# Patient Record
(demographics unavailable — no encounter records)

---

## 2025-03-05 NOTE — PHYSICAL EXAM
[No Acute Distress] : no acute distress [Well Nourished] : well nourished [Well Developed] : well developed [Well-Appearing] : well-appearing [Normal Sclera/Conjunctiva] : normal sclera/conjunctiva [PERRL] : pupils equal round and reactive to light [EOMI] : extraocular movements intact [Normal Outer Ear/Nose] : the outer ears and nose were normal in appearance [Normal Oropharynx] : the oropharynx was normal [No JVD] : no jugular venous distention [No Lymphadenopathy] : no lymphadenopathy [Supple] : supple [Thyroid Normal, No Nodules] : the thyroid was normal and there were no nodules present [No Respiratory Distress] : no respiratory distress  [No Accessory Muscle Use] : no accessory muscle use [Clear to Auscultation] : lungs were clear to auscultation bilaterally [Normal Rate] : normal rate  [Regular Rhythm] : with a regular rhythm [Normal S1, S2] : normal S1 and S2 [No Murmur] : no murmur heard [No Varicosities] : no varicosities [Pedal Pulses Present] : the pedal pulses are present [No Edema] : there was no peripheral edema [No Extremity Clubbing/Cyanosis] : no extremity clubbing/cyanosis [Soft] : abdomen soft [Non Tender] : non-tender [Non-distended] : non-distended [Normal Bowel Sounds] : normal bowel sounds [Normal Posterior Cervical Nodes] : no posterior cervical lymphadenopathy [Normal Anterior Cervical Nodes] : no anterior cervical lymphadenopathy [No CVA Tenderness] : no CVA  tenderness [No Spinal Tenderness] : no spinal tenderness [No Joint Swelling] : no joint swelling [Grossly Normal Strength/Tone] : grossly normal strength/tone [No Rash] : no rash [Coordination Grossly Intact] : coordination grossly intact [No Focal Deficits] : no focal deficits [Normal Gait] : normal gait [Deep Tendon Reflexes (DTR)] : deep tendon reflexes were 2+ and symmetric [Normal Affect] : the affect was normal [Normal Insight/Judgement] : insight and judgment were intact

## 2025-03-06 NOTE — ASSESSMENT
[FreeTextEntry1] : 33 y.o. F with PMHx of hypothyroidism and anxiety presents to the office for annual physical examination

## 2025-03-06 NOTE — HEALTH RISK ASSESSMENT
[Yes] : Yes [Monthly or less (1 pt)] : Monthly or less (1 point) [1 or 2 (0 pts)] : 1 or 2 (0 points) [Never (0 pts)] : Never (0 points) [No] : In the past 12 months have you used drugs other than those required for medical reasons? No [No falls in past year] : Patient reported no falls in the past year [0] : 2) Feeling down, depressed, or hopeless: Not at all (0) [PHQ-2 Negative - No further assessment needed] : PHQ-2 Negative - No further assessment needed [Never] : Never [With Family] : lives with family [Employed] : employed [] :  [Sexually Active] : sexually active [Fully functional (bathing, dressing, toileting, transferring, walking, feeding)] : Fully functional (bathing, dressing, toileting, transferring, walking, feeding) [Fully functional (using the telephone, shopping, preparing meals, housekeeping, doing laundry, using] : Fully functional and needs no help or supervision to perform IADLs (using the telephone, shopping, preparing meals, housekeeping, doing laundry, using transportation, managing medications and managing finances) [Reports normal functional visual acuity (ie: able to read med bottle)] : Reports normal functional visual acuity [Audit-CScore] : 1 [[id number]] : 0 [High Risk Behavior] : no high risk behavior [Reports changes in hearing] : Reports no changes in hearing [PapSmearDate] : 04/2025 [PapSmearComments] : pending

## 2025-03-06 NOTE — PLAN
[FreeTextEntry1] : HCM # up to date with FLU RSV vaccinations 08/2024 - Declining COVID booster at this time - Patient has f/u for pap smear 04/2025  1. Annual physical examination - Obtain CBC, CMP, lipid profile, Hgb A1c, and thyroid panel  2. Hypothyroidism - Currently on Synthroid 125mcg qd - F/u TSH and free t4  3. Anxiety and depression - Well controlled on Zoloft 75mg.

## 2025-03-06 NOTE — HISTORY OF PRESENT ILLNESS
[FreeTextEntry1] : annual wellness [de-identified] : 33 y.o. F with PMHx of hypothyroidism, anxiety, depression, and rectocele, presents to the office for annual physical examination. Patient gave birth to male infant 10/2024,  no complications. Patient overall states she is in good health, up to date with routine visits to GYN and Endocrine. Patient last visited endocrine  had TSH tested and synthroid dosage increased to 125 mcg. Patient reports she started Semaglutide .75mg due to weight gain during pregnancy and has had 16lb weight loss which was her goal. Patient follows her therapist who resumed patient on Zoloft 75mg after previously increasing to 100mg. Patient up to date with FLU RSV vaccinations as of  2024, last gyn visit 2024, pap scheduled got 2025.   yo M/F with PMHx of  presenting to clinic to Establish care and for an Annual Physical Exam.   No other complaints at this time.

## 2025-03-06 NOTE — HISTORY OF PRESENT ILLNESS
[FreeTextEntry1] : annual wellness [de-identified] : 33 y.o. F with PMHx of hypothyroidism, anxiety, depression, and rectocele, presents to the office for annual physical examination. Patient gave birth to male infant 10/2024,  no complications. Patient overall states she is in good health, up to date with routine visits to GYN and Endocrine. Patient last visited endocrine  had TSH tested and synthroid dosage increased to 125 mcg. Patient reports she started Semaglutide .75mg due to weight gain during pregnancy and has had 16lb weight loss which was her goal. Patient follows her therapist who resumed patient on Zoloft 75mg after previously increasing to 100mg. Patient up to date with FLU RSV vaccinations as of  2024, last gyn visit 2024, pap scheduled got 2025.   yo M/F with PMHx of  presenting to clinic to Establish care and for an Annual Physical Exam.   No other complaints at this time.

## 2025-03-06 NOTE — HEALTH RISK ASSESSMENT
[Yes] : Yes [Monthly or less (1 pt)] : Monthly or less (1 point) [1 or 2 (0 pts)] : 1 or 2 (0 points) [Never (0 pts)] : Never (0 points) [No] : In the past 12 months have you used drugs other than those required for medical reasons? No [No falls in past year] : Patient reported no falls in the past year [0] : 2) Feeling down, depressed, or hopeless: Not at all (0) [PHQ-2 Negative - No further assessment needed] : PHQ-2 Negative - No further assessment needed [Never] : Never [With Family] : lives with family [Employed] : employed [] :  [Sexually Active] : sexually active [Fully functional (bathing, dressing, toileting, transferring, walking, feeding)] : Fully functional (bathing, dressing, toileting, transferring, walking, feeding) [Fully functional (using the telephone, shopping, preparing meals, housekeeping, doing laundry, using] : Fully functional and needs no help or supervision to perform IADLs (using the telephone, shopping, preparing meals, housekeeping, doing laundry, using transportation, managing medications and managing finances) [Reports normal functional visual acuity (ie: able to read med bottle)] : Reports normal functional visual acuity [Audit-CScore] : 1 [[id number]] : 0 [High Risk Behavior] : no high risk behavior [Reports changes in hearing] : Reports no changes in hearing [PapSmearDate] : 04/2025 [PapSmearComments] : pending